# Patient Record
Sex: FEMALE | Race: WHITE | NOT HISPANIC OR LATINO | URBAN - METROPOLITAN AREA
[De-identification: names, ages, dates, MRNs, and addresses within clinical notes are randomized per-mention and may not be internally consistent; named-entity substitution may affect disease eponyms.]

---

## 2017-05-03 ENCOUNTER — IMPORTED ENCOUNTER (OUTPATIENT)
Dept: URBAN - METROPOLITAN AREA CLINIC 31 | Facility: CLINIC | Age: 82
End: 2017-05-03

## 2017-05-03 PROBLEM — Z96.1: Noted: 2017-05-03

## 2017-05-03 PROBLEM — H10.403: Noted: 2017-05-03

## 2017-05-03 PROCEDURE — 92014 COMPRE OPH EXAM EST PT 1/>: CPT

## 2017-05-03 NOTE — PATIENT DISCUSSION
1.  Pseudophakia OU - IOLs stable. Monitor. 2. Allergic Conjunctivitis OU -- The condition was  discussed with the patient. Avoidance of allergens and cool compresses were recommended. Alaway PRN. 3. Return for an appointment in 1 year for comprehensive exam. with Dr. Leonel Lopez.

## 2017-06-16 ENCOUNTER — APPOINTMENT (RX ONLY)
Dept: URBAN - METROPOLITAN AREA CLINIC 116 | Facility: CLINIC | Age: 82
Setting detail: DERMATOLOGY
End: 2017-06-16

## 2017-06-16 DIAGNOSIS — L91.8 OTHER HYPERTROPHIC DISORDERS OF THE SKIN: ICD-10-CM

## 2017-06-16 DIAGNOSIS — L82.1 OTHER SEBORRHEIC KERATOSIS: ICD-10-CM

## 2017-06-16 PROCEDURE — ? COUNSELING

## 2017-06-16 PROCEDURE — 11200 RMVL SKIN TAGS UP TO&INC 15: CPT

## 2017-06-16 PROCEDURE — ? SKIN TAG REMOVAL

## 2017-06-16 PROCEDURE — 99213 OFFICE O/P EST LOW 20 MIN: CPT | Mod: 25

## 2017-06-16 ASSESSMENT — LOCATION ZONE DERM
LOCATION ZONE: NECK
LOCATION ZONE: FACE

## 2017-06-16 ASSESSMENT — LOCATION DETAILED DESCRIPTION DERM
LOCATION DETAILED: LEFT INFERIOR LATERAL FOREHEAD
LOCATION DETAILED: LEFT INFERIOR ANTERIOR NECK

## 2017-06-16 ASSESSMENT — LOCATION SIMPLE DESCRIPTION DERM
LOCATION SIMPLE: LEFT ANTERIOR NECK
LOCATION SIMPLE: LEFT FOREHEAD

## 2017-06-16 NOTE — PROCEDURE: SKIN TAG REMOVAL
Detail Level: Simple
Medical Necessity Information: It is in your best interest to select a reason for this procedure from the list below. All of these items fulfill various CMS LCD requirements except the new and changing color options.
Medical Necessity Clause: This procedure was medically necessary because the lesion that was treated was enlarging
Anesthesia Volume In Cc: 0
Include Z78.9 (Other Specified Conditions Influencing Health Status) As An Associated Diagnosis?: No
Consent: Written consent obtained and the risks of skin tag removal was reviewed with the patient including but not limited to bleeding, pigmentary change, infection, pain, and remote possibility of scarring.

## 2017-06-16 NOTE — PROCEDURE: MIPS QUALITY
Quality 131: Pain Assessment And Follow-Up: Pain assessment using a standardized tool is documented as negative, no follow-up plan required
Quality 130: Documentation Of Current Medications In The Medical Record: Current Medications not Documented, Patient not Eligible
Quality 265: Biopsy Follow-Up: Biopsy results reviewed, communicated, tracked, and documented
Quality 110: Preventive Care And Screening: Influenza Immunization: Influenza Immunization Administered during Influenza season
Detail Level: Detailed
Quality 226: Preventive Care And Screening: Tobacco Use: Screening And Cessation Intervention: Patient screened for tobacco and never smoked
Quality 111:Pneumonia Vaccination Status For Older Adults: Pneumococcal Vaccination Previously Received
Quality 431: Preventive Care And Screening: Unhealthy Alcohol Use - Screening: Patient screened for unhealthy alcohol use using a single question and scores less than 2 times per year

## 2018-04-30 ENCOUNTER — IMPORTED ENCOUNTER (OUTPATIENT)
Dept: URBAN - METROPOLITAN AREA CLINIC 31 | Facility: CLINIC | Age: 83
End: 2018-04-30

## 2018-04-30 PROBLEM — H10.403: Noted: 2018-04-30

## 2018-04-30 PROBLEM — Z96.1: Noted: 2018-04-30

## 2018-04-30 PROCEDURE — 92250 FUNDUS PHOTOGRAPHY W/I&R: CPT

## 2018-04-30 PROCEDURE — 92014 COMPRE OPH EXAM EST PT 1/>: CPT

## 2018-04-30 PROCEDURE — 92015 DETERMINE REFRACTIVE STATE: CPT

## 2018-04-30 NOTE — PATIENT DISCUSSION
1.  Pseudophakia OU - IOLs stable. Monitor. 2. Allergic Conjunctivitis OU -- The condition was  discussed with the patient. Alaway PRN. 3. Refractive error - rx for night time driving with [employer]. 4. Return for an appointment in 1 year for comprehensive exam. with Dr. Mariaelena Chavez.

## 2019-04-30 ENCOUNTER — IMPORTED ENCOUNTER (OUTPATIENT)
Dept: URBAN - METROPOLITAN AREA CLINIC 31 | Facility: CLINIC | Age: 84
End: 2019-04-30

## 2019-04-30 PROBLEM — H10.403: Noted: 2019-04-30

## 2019-04-30 PROBLEM — Z96.1: Noted: 2019-04-30

## 2019-04-30 PROCEDURE — 92014 COMPRE OPH EXAM EST PT 1/>: CPT

## 2019-04-30 NOTE — PATIENT DISCUSSION
1.  Pseudophakia OU - IOLs stable. Monitor. 2. Allergic Conjunctivitis OU -- The condition was  discussed with the patient. Continue Alaway PRN. 3. Refractive error - change optional.4. Return for an appointment in 1 year for comprehensive exam with Dr. Yuan Choi.

## 2020-10-29 ENCOUNTER — IMPORTED ENCOUNTER (OUTPATIENT)
Dept: URBAN - METROPOLITAN AREA CLINIC 31 | Facility: CLINIC | Age: 85
End: 2020-10-29

## 2020-10-29 PROBLEM — H10.403: Noted: 2020-10-29

## 2020-10-29 PROBLEM — H10.45: Noted: 2020-10-29

## 2020-10-29 PROBLEM — Z96.1: Noted: 2020-10-29

## 2020-10-29 PROCEDURE — 92014 COMPRE OPH EXAM EST PT 1/>: CPT

## 2020-10-29 PROCEDURE — 92015 DETERMINE REFRACTIVE STATE: CPT

## 2020-10-29 NOTE — PATIENT DISCUSSION
1.  Pseudophakia OU - IOLs stable. Monitor. 2. Allergic Conjunctivitis OU -- Difficulty with gtt so can try Maxitrol ousmane HS to lids PRN3. Refractive error - change optional.4. Return for an appointment in 1 year for comprehensive exam with Dr. Merced Hugo.

## 2022-04-02 ASSESSMENT — TONOMETRY
OD_IOP_MMHG: 11
OS_IOP_MMHG: 12
OS_IOP_MMHG: 11
OD_IOP_MMHG: 8
OS_IOP_MMHG: 11
OD_IOP_MMHG: 12
OD_IOP_MMHG: 9
OS_IOP_MMHG: 9

## 2022-04-02 ASSESSMENT — VISUAL ACUITY
OS_CC: 20/20-1
OD_CC: 20/40
OD_CC: 20/25+1
OS_CC: 20/30
OU_CC: 20/20
OS_CC: 20/30+2
OD_CC: 20/30
OS_CC: 20/25
OD_CC: 20/30-1
OU_CC: 20/20

## 2022-05-09 ENCOUNTER — COMPREHENSIVE EXAM (OUTPATIENT)
Dept: URBAN - METROPOLITAN AREA CLINIC 29 | Facility: CLINIC | Age: 87
End: 2022-05-09

## 2022-05-09 DIAGNOSIS — Z96.1: ICD-10-CM

## 2022-05-09 DIAGNOSIS — H10.45: ICD-10-CM

## 2022-05-09 DIAGNOSIS — H04.123: ICD-10-CM

## 2022-05-09 PROCEDURE — 99214 OFFICE O/P EST MOD 30 MIN: CPT

## 2022-05-09 ASSESSMENT — TONOMETRY
OD_IOP_MMHG: 10
OS_IOP_MMHG: 10

## 2022-05-09 ASSESSMENT — VISUAL ACUITY
OS_PH: 20/25
OD_PH: 20/20-1
OS_SC: 20/40
OD_SC: 20/40

## 2022-05-09 NOTE — PATIENT DISCUSSION
Can try Pataday or Lastacaft PRN but has had some difficulty with drops so if necessary can use Maxitrol ousmane PRN.

## 2023-08-28 ENCOUNTER — COMPREHENSIVE EXAM (OUTPATIENT)
Dept: URBAN - METROPOLITAN AREA CLINIC 29 | Facility: CLINIC | Age: 88
End: 2023-08-28

## 2023-08-28 DIAGNOSIS — H10.45: ICD-10-CM

## 2023-08-28 DIAGNOSIS — H04.123: ICD-10-CM

## 2023-08-28 DIAGNOSIS — Z96.1: ICD-10-CM

## 2023-08-28 PROCEDURE — 92015 DETERMINE REFRACTIVE STATE: CPT

## 2023-08-28 PROCEDURE — 99214 OFFICE O/P EST MOD 30 MIN: CPT

## 2023-08-28 ASSESSMENT — TONOMETRY
OS_IOP_MMHG: 10
OD_IOP_MMHG: 12

## 2023-08-28 ASSESSMENT — VISUAL ACUITY
OS_SC: 20/40
OD_SC: 20/30

## 2024-11-14 ENCOUNTER — COMPREHENSIVE EXAM (OUTPATIENT)
Dept: URBAN - METROPOLITAN AREA CLINIC 29 | Facility: CLINIC | Age: 89
End: 2024-11-14

## 2024-11-14 DIAGNOSIS — Z96.1: ICD-10-CM

## 2024-11-14 DIAGNOSIS — H04.123: ICD-10-CM

## 2024-11-14 DIAGNOSIS — H52.13: ICD-10-CM

## 2024-11-14 DIAGNOSIS — H52.223: ICD-10-CM

## 2024-11-14 DIAGNOSIS — H35.363: ICD-10-CM

## 2024-11-14 DIAGNOSIS — H10.45: ICD-10-CM

## 2024-11-14 DIAGNOSIS — H52.4: ICD-10-CM

## 2024-11-14 PROCEDURE — 99214 OFFICE O/P EST MOD 30 MIN: CPT

## 2024-11-14 PROCEDURE — 92015 DETERMINE REFRACTIVE STATE: CPT

## 2024-12-05 ENCOUNTER — FOLLOW UP (OUTPATIENT)
Age: 89
End: 2024-12-05

## 2024-12-05 DIAGNOSIS — H26.493: ICD-10-CM

## 2024-12-05 DIAGNOSIS — H04.123: ICD-10-CM

## 2024-12-05 PROCEDURE — 99213 OFFICE O/P EST LOW 20 MIN: CPT

## 2025-03-18 ENCOUNTER — FOLLOW UP (OUTPATIENT)
Age: 89
End: 2025-03-18

## 2025-03-18 DIAGNOSIS — H04.123: ICD-10-CM

## 2025-03-18 DIAGNOSIS — H26.493: ICD-10-CM

## 2025-03-18 PROCEDURE — 99213 OFFICE O/P EST LOW 20 MIN: CPT
